# Patient Record
Sex: FEMALE | Race: WHITE | URBAN - METROPOLITAN AREA
[De-identification: names, ages, dates, MRNs, and addresses within clinical notes are randomized per-mention and may not be internally consistent; named-entity substitution may affect disease eponyms.]

---

## 2022-05-10 ENCOUNTER — HOSPITAL ENCOUNTER (OUTPATIENT)
Dept: PHYSICAL THERAPY | Age: 62
Discharge: HOME | End: 2022-05-10
Payer: COMMERCIAL

## 2022-05-10 PROCEDURE — 97162 PT EVAL MOD COMPLEX 30 MIN: CPT

## 2022-05-10 PROCEDURE — 97110 THERAPEUTIC EXERCISES: CPT

## 2022-05-10 PROCEDURE — 97530 THERAPEUTIC ACTIVITIES: CPT

## 2022-05-10 NOTE — PROGRESS NOTES
[facility]  : 1960  Primary: Zac Silva Of Alisa Man*  Secondary:  [facility] [place] [address] [facility], [address] [place] [address],[address], [address]  [phone]   [phone]       OUTPATIENT PHYSICAL THERAPY: Daily Treatment Note 5/10/2022   Visit Count:  1  ICD-10: Treatment Diagnosis: Lack of coordination (muscle incoordination) (R27.8), Pelvic floor dysfunction in female (M62.98), Mixed incontinence (Urge and stress incontinence) (N39.46), Nocturia (R35.1) and Myalgia (M79.1)  Precautions/Allergies:   Patient has no allergy information on record. TREATMENT PLAN:  Effective Dates: 5/10/2022 TO 2022 (90 days). Frequency/Duration: 1 time a week for 90 Day(s) MEDICAL/REFERRING DIAGNOSIS:  PFD (pelvic floor dysfunction) [M62.89]  DATE OF ONSET: 2 years ago ()  REFERRING PHYSICIAN: Maria Ines Manning MD MD Orders: Evaluate and treat  Return MD Appointment: Unknown     Pre-treatment Symptoms/Complaints: Mixed UI (IMAN>UUI) (IMAN with transitional movements), nocturia    Pain: Initial: Pain Intensity 1: 0/10 Post Session: 0/10   Medications Last Reviewed:  5/10/2022  Updated Objective Findings:  See evaluation note from today      TREATMENT:   THERAPEUTIC EXERCISE: (10 minutes):  Exercises per grid below to improve mobility, strength and coordination. Required moderate visual, verbal and tactile cues to promote proper body mechanics and promote proper body breathing techniques. Progressed resistance, range and repetitions as indicated.    Date:  5/10/22 Date:   Date:     Activity/Exercise Parameters Parameters Parameters   PF drops 2x10                   THERAPEUTIC ACTIVITY: (15 minutes): Functional activity education regarding anatomy, pathology and role of pelvic floor muscle (PFM) function in relation to presenting symptoms and role of pelvic floor therapy in conservative treatment., Functional activity instruction on use 'the knack' and feedforward activation of pelvic floor muscles prior to activity that increases intra-abdominal pressure (IAP) such as cough, sneeze, laugh in order to minimize stress urinary incontinence. and Instruction on coordinated pelvic floor and diaphragmatic breathing to improve kinesthetic awareness of pelvic muscle mobility and restore proper motor recruitment patterns with breathing, posture, and functional movement (e.g. appropriate lift/contraction with increased IAP such as a cough, laugh, sneeze to prevent incontinence as well as appropriate relaxation/drop to reduce pain with vaginal penetration). TA Educational Topic Notes Date Completed   Pathology/Anatomy/PFM Function Reviewed 5/10/22   Bladder health education     Urinary urge suppression     The knliang Demonstrated and reviewed, discussed functional use of kegels during daily routine (with STS, cough/sneeze, bend/lift), handout provided 5/10/22   Voiding strategies     Bowel/Bladder log     Bowel health education     Constipation care     Diarrhea/Fecal leakage     Colonic massage     Toilet positioning     Defecation dynamics     Sources of fiber     Return to intercourse/Dilator training     Sexual positioning     Lubricants/vaginal moisturizers     Vaginal irritants     Body mechanics     Posture/ergonomics     Diaphragmatic breathing Demonstrated and reviewed, discussed use for management of urinary urgency 5/10/22   Resources and technology       MANUAL THERAPY: (0 minutes): Soft tissue mobilization was utilized and necessary because of the patient's painful spasm and restricted motion of soft tissue.     Date Type Location Comments   5/10/2022 Internal assessment/treatment Via vaginal canal Assessment completed                                       (Used abbreviations: MET - muscle energy technique; SCS- Strain counter strain; CTM-Connective tissue mobilizations; CR- Contract/Relax; SP- Sustained pressure; PIT- Positional inhibition techniques; STM Soft -tissue mobilization; MM- Myofascial mobilization; TrP-Trigger point release; IASTM- Instrument assisted soft tissue mobilizations, TDN-Trigger point dry needling)    Pt gives verbal consent to internal vaginal assessment/treatment without chaperon present. Treatment/Session Summary:    · Response to Treatment:  Pt reports good understanding of plan of care, as well as prescribed home exercise program.  All questions were answered to pt's satisfaction. Pt was invited to call with any further questions or concerns. · Communication/Consultation:  None today  · Equipment provided today:  None today  · Recommendations/Intent for next treatment session: Next visit will focus on MSK and functional movement screen, body and breathing mechanics and education on pressure management, urge suppression techniques, review PF drops. · Variation from POC: N/A  Total Treatment Billable Duration: Evaluation 35 minutes, treatment 25 minutes  PT Patient Time In/Time Out  Time In: 1500  Time Out: [address], PT     No future appointments.      Visit # Therapist initials Date Arrived NS/ Cx < 24 hr >24 hr Cx Visit Comments   1 JONH 5/10/22 2:40pm   Initial Assessment and Treatment                                                                             Abbreviations: NS = No Show; CX = cancelled
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
## 2022-09-26 ENCOUNTER — HOSPITAL ENCOUNTER (OUTPATIENT)
Dept: PHYSICAL THERAPY | Age: 62
Setting detail: RECURRING SERIES
Discharge: HOME | End: 2022-09-29
Payer: COMMERCIAL

## 2022-09-26 PROCEDURE — 97530 THERAPEUTIC ACTIVITIES: CPT

## 2022-09-26 PROCEDURE — 97162 PT EVAL MOD COMPLEX 30 MIN: CPT

## 2022-09-26 NOTE — PROGRESS NOTES
Sade Munguia  : 1960  Primary: Jeana Soulier Sc  Secondary:  Morgan Gonzales  [facility] [address]  [address] [address]  Phone: [phone]  Fax: [phone] No data recorded  No data recorded    PT Visit Info:  No data recorded   Visit Count:  Visit count could not be calculated. Make sure you are using a visit which is associated with an episode. OUTPATIENT PHYSICAL THERAPY:OP NOTE TYPE: Treatment Note 2022       Episode  }Appt Desk             Treatment Diagnosis:    Treatment Diagnosis:  Lack of coordination (muscle incoordination) (R27.8)  Pelvic floor dysfunction in female (M62.98)  Urge incontinence (N39.41)  Overactive bladder (Detrusor muscle hyperactivity) (N32.81)  Myalgia (M79.1)  Medical/Referring Diagnosis:  PFD (pelvic floor dysfunction) [M62.89]  OAB (overactive bladder) [N32.81]  Referring Physician:  JASSON Sotomayor NP, MD Orders:  PT Eval and Treat   Date of Onset:  No data recorded   Allergies:   Patient has no allergy information on record. Restrictions/Precautions:  No data recorded  No data recorded   Interventions Planned (Treatment may consist of any combination of the following):    Current Treatment Recommendations: Strengthening; ROM; Functional mobility training; Neuromuscular re-education; Manual Therapy - Soft Tissue Mobilization; Home exercise program; Pain management; Therapeutic activities     Subjective Comments:   See initial evaluation.  UUI>LUKASZ  Initial:}     /10Post Session:        /10  Medications Last Reviewed:  2022  Updated Objective Findings:  See evaluation note from today  Treatment   THERAPEUTIC ACTIVITY: ( 25 minutes): Functional activity education regarding anatomy, pathology and role of pelvic floor muscle (PFM) function in relation to presenting symptoms and role of pelvic floor therapy in conservative treatment., Functional activity education on avoiding bladder irritants, substitutions for common irritants, recommended fluid intake (types and spacing), appropriate voiding frequency, weight management and overall contributing factors of bowel health on bladder health/function in order to improve independent self management. Patient was provided a list of common bladder irritants including caffeine, carbonation, alcohol, artificial sweeteners, chocolate, acidic drinks, and tomato based drinks. , and Instruction on coordinated pelvic floor and diaphragmatic breathing to improve kinesthetic awareness of pelvic muscle mobility and restore proper motor recruitment patterns with breathing, posture, and functional movement (e.g. appropriate lift/contraction with increased IAP such as a cough, laugh, sneeze to prevent incontinence as well as appropriate relaxation/drop to reduce pain with vaginal penetration). TA Educational Topic Notes Date Completed   Pathology/Anatomy/PFM Function Completed 9/26/22   Bladder health education Completed 9/26/22   Urinary urge suppression     The knack     Voiding strategies     Nocturia tips     Bowel/Bladder log     Bowel health education     Constipation care     Diarrhea/Fecal leakage     Colonic massage     Toilet positioning     Defecation dynamics     Sources of fiber     Return to intercourse/Dilator training     Sexual positioning     Lubricants/vaginal moisturizers     Vaginal irritants     Body mechanics     Posture/ergonomics     Diaphragmatic breathing Prescribed and practiced in session as part of HEP 9/26/22   Resources and technology          Treatment/Session Summary:    Treatment Assessment: Pt verbalized understanding of POC. All questions answered at this time.       Communication/Consultation:  None today  Equipment provided today:  None  Recommendations/Intent for next treatment session: Next visit will focus on review HEP, PFM coordination training, sEMG biofeedback for proprioceptive awareness of PFM    Total Treatment Billable Duration:  30 minutes evaluation, 25 minutes treatment  Time In: 1082  Time Out: [address], PT       Charge Capture  }Post Session Pain  PT Visit Info  [address] Portal  MD Guidelines  Scanned Media  Benefits  MyChart    Future Appointments   Date Time Provider Delisa Garcia   10/3/2022 10:00 AM Conrad vivar PT [facility]   10/10/2022  2:00 PM Conrad vivar PT [facility]   10/17/2022  2:00 PM Conrad vivar PT [facility]   10[facility]24[facility]2022  [facility]   10/31/2022  2:00 PM Conrad vivar PT [facility]

## 2022-10-03 ENCOUNTER — HOSPITAL ENCOUNTER (OUTPATIENT)
Dept: PHYSICAL THERAPY | Age: 62
Setting detail: RECURRING SERIES
Discharge: HOME | End: 2022-10-06
Payer: COMMERCIAL

## 2022-10-03 PROCEDURE — 97140 MANUAL THERAPY 1/> REGIONS: CPT

## 2022-10-03 PROCEDURE — 97110 THERAPEUTIC EXERCISES: CPT

## 2022-10-03 PROCEDURE — 97530 THERAPEUTIC ACTIVITIES: CPT

## 2022-10-03 NOTE — PROGRESS NOTES
La Méndez  : 1960  Primary: Krystal Bustillo Sc  Secondary:  Hardik Hobson  [address] [address]  Phone: [phone]  Fax: [phone] No data recorded  No data recorded    PT Visit Info:  No data recorded   Visit Count:  2   OUTPATIENT PHYSICAL THERAPY:OP NOTE TYPE: Treatment Note 10/3/2022       Episode  }Appt Desk             Treatment Diagnosis:  Lack of coordination (muscle incoordination) (R27.8)  Pelvic floor dysfunction in female (M62.98)  Urge incontinence (N39.41)  Overactive bladder (Detrusor muscle hyperactivity) (N32.81)  Myalgia (M79.1)  Medical/Referring Diagnosis:  PFD (pelvic floor dysfunction) [M62.89]  OAB (overactive bladder) [N32.81]  Referring Physician:  JASSON Lau NP, MD Orders:  PT Eval and Treat   Date of Onset:  No data recorded   Allergies:   Patient has no allergy information on record. Restrictions/Precautions:  No data recorded  No data recorded   Interventions Planned (Treatment may consist of any combination of the following):    Current Treatment Recommendations: Strengthening; ROM; Functional mobility training; Neuromuscular re-education; Manual Therapy - Soft Tissue Mobilization; Home exercise program; Pain management; Therapeutic activities     Subjective Comments:  Today: Pt is practicing diaphragmatic breathing, which feels very intentional.   Pt denies pain at this time and following the examination. Subjective at Evaluation: UUI>LUKASZ  Ms. Torres Rater is a 81 yo female referred to pelvic floor physical therapy (PFPT) by Dylon Ruvalcaba,* 2/2 PFD (pelvic floor dysfunction) [M62.89]  OAB (overactive bladder) [N32.81] Pt reports that symptoms began 2 years ago with urinary urgency and inability to make it to the restroom in time. States she has 3 days/week where she has difficulty controlling her bladder. She notices this is worse when she travels. LUKASZ is less common.      Initial:}     0/10Post Session:        /10  Medications Last Reviewed:  10/3/2022  Updated Objective Findings:    Tender to palpation throughout superficial and deep PFM. Paradoxical contraction present. Minimal descent in PFM. Treatment   THERAPEUTIC ACTIVITY: (15 minutes): Functional activity education regarding anatomy, pathology and role of pelvic floor muscle (PFM) function in relation to presenting symptoms and role of pelvic floor therapy in conservative treatment., Functional activity education on avoiding bladder irritants, substitutions for common irritants, recommended fluid intake (types and spacing), appropriate voiding frequency, weight management and overall contributing factors of bowel health on bladder health/function in order to improve independent self management. Patient was provided a list of common bladder irritants including caffeine, carbonation, alcohol, artificial sweeteners, chocolate, acidic drinks, and tomato based drinks. , and Instruction on coordinated pelvic floor and diaphragmatic breathing to improve kinesthetic awareness of pelvic muscle mobility and restore proper motor recruitment patterns with breathing, posture, and functional movement (e.g. appropriate lift/contraction with increased IAP such as a cough, laugh, sneeze to prevent incontinence as well as appropriate relaxation/drop to reduce pain with vaginal penetration).     TA Educational Topic Notes Date Completed   Pathology/Anatomy/PFM Function Completed 9/26/22   Bladder health education Completed 9/26/22   Urinary urge suppression Reviewed and provided handout 10/3/22   The carlito     Voiding strategies     Nocturia tips     Bowel/Bladder log     Bowel health education     Constipation care     Diarrhea/Fecal leakage     Colonic massage     Toilet positioning     Defecation dynamics     Sources of fiber     Return to intercourse/Dilator training     Sexual positioning     Lubricants/vaginal moisturizers     Vaginal irritants     Body Guidelines  Scanned Media  Benefits  MyChart    Future Appointments   Date Time Provider Delisa Garcia   [facility]   [facility]  [facility]   10/24/2022  2:00 PM Conrad vivar [facility]   10/31/2022  2:00 PM Conrad vivar [facility]

## 2022-10-10 ENCOUNTER — HOSPITAL ENCOUNTER (OUTPATIENT)
Dept: PHYSICAL THERAPY | Age: 62
Setting detail: RECURRING SERIES
Discharge: HOME | End: 2022-10-13
Payer: COMMERCIAL

## 2022-10-10 PROCEDURE — 97530 THERAPEUTIC ACTIVITIES: CPT

## 2022-10-10 PROCEDURE — 97140 MANUAL THERAPY 1/> REGIONS: CPT

## 2022-10-10 PROCEDURE — 97110 THERAPEUTIC EXERCISES: CPT

## 2022-10-10 NOTE — PROGRESS NOTES
Bess Dema  : 1960  Primary: Joanne Jacobo Sc  Secondary:  Wesly Moss 82  [address] [address]  Phone: [phone]  Fax: [phone] No data recorded  No data recorded    PT Visit Info:  No data recorded   Visit Count:  3   OUTPATIENT PHYSICAL THERAPY:OP NOTE TYPE: Treatment Note 10/10/2022       Episode  }Appt Desk             Treatment Diagnosis:  Lack of coordination (muscle incoordination) (R27.8)  Pelvic floor dysfunction in female (M62.98)  Urge incontinence (N39.41)  Overactive bladder (Detrusor muscle hyperactivity) (N32.81)  Myalgia (M79.1)  Medical/Referring Diagnosis:  PFD (pelvic floor dysfunction) [M62.89]  OAB (overactive bladder) [N32.81]  Referring Physician:  JASSON Fontaine NP, MD Orders:  PT Eval and Treat   Date of Onset:  No data recorded   Allergies:   Patient has no allergy information on record. Restrictions/Precautions:  No data recorded  No data recorded   Interventions Planned (Treatment may consist of any combination of the following):    Current Treatment Recommendations: Strengthening; ROM; Functional mobility training; Neuromuscular re-education; Manual Therapy - Soft Tissue Mobilization; Home exercise program; Pain management; Therapeutic activities     Subjective Comments:  Pt states she continues to have UI, however exercises are helping to minimize this. Pt reports cramping following previous session of PFPT. This lasted the rest of the day into the next day. She has not had any pain since. When she feels urge she is practicing breathing first.   Drinking water consistently. Subjective at Evaluation: UUI>LUKASZ  Ms. Bhavya Phoenix is a 81 yo female referred to pelvic floor physical therapy (PFPT) by Waleska Delacruz,* 2/2 PFD (pelvic floor dysfunction) [M62.89]  OAB (overactive bladder) [N32.81] Pt reports that symptoms began 2 years ago with urinary urgency and inability to make it to the restroom in time.  States she has 3 days/week where she has difficulty controlling her bladder. She notices this is worse when she travels. LUKASZ is less common. Initial:}     0/10Post Session:        /10  Medications Last Reviewed:  10/10/2022  Updated Objective Findings:      Tender to palpation through left vaginal wall. Moderate descent in PFM. PFM contraction: 4/5. Pt able to complete coordinated quick flicks today    Treatment   THERAPEUTIC ACTIVITY: (10 minutes): Functional activity education regarding anatomy, pathology and role of pelvic floor muscle (PFM) function in relation to presenting symptoms and role of pelvic floor therapy in conservative treatment., Functional activity education on avoiding bladder irritants, substitutions for common irritants, recommended fluid intake (types and spacing), appropriate voiding frequency, weight management and overall contributing factors of bowel health on bladder health/function in order to improve independent self management. Patient was provided a list of common bladder irritants including caffeine, carbonation, alcohol, artificial sweeteners, chocolate, acidic drinks, and tomato based drinks. , and Instruction on coordinated pelvic floor and diaphragmatic breathing to improve kinesthetic awareness of pelvic muscle mobility and restore proper motor recruitment patterns with breathing, posture, and functional movement (e.g. appropriate lift/contraction with increased IAP such as a cough, laugh, sneeze to prevent incontinence as well as appropriate relaxation/drop to reduce pain with vaginal penetration).     TA Educational Topic Notes Date Completed   Pathology/Anatomy/PFM Function Completed 9/26/22   Bladder health education Completed 9/26/22   Urinary urge suppression Reviewed and provided handout  Reviewed 10/3/22  10/10/22   The carlito     Voiding strategies     Nocturia tips     Bowel/Bladder log     Bowel health education     Constipation care     Diarrhea/Fecal leakage     Colonic massage     Toilet positioning     Defecation dynamics     Sources of fiber     Return to intercourse/Dilator training     Sexual positioning     Lubricants/vaginal moisturizers     Vaginal irritants     Body mechanics     Posture/ergonomics     Diaphragmatic breathing Prescribed and practiced in session as part of HEP  reviewed 9/26/22  10/3/22   Resources and technology        MANUAL THERAPY: (15 minutes): Soft tissue mobilization was utilized and necessary because of the patient's restricted motion of soft tissue. Date Type Location Comments   10/10/2022 Internal assessment/treatment Via vaginal canal CTM through superficial and deep PFM. Digital cues for PFM contraction - light to moderate pressure applied to musculature                                       (Used abbreviations: MET - muscle energy technique; SCS- Strain counter strain; CTM-Connective tissue mobilizations; CR- Contract/Relax; SP- Sustained pressure; PIT- Positional inhibition techniques; STM Soft -tissue mobilization; MM- Myofascial mobilization; TrP-Trigger point release; IASTM- Instrument assisted soft tissue mobilizations, TDN-Trigger point dry needling)     THERAPEUTIC EXERCISE: (30 minutes)  Digital cues for PF drops with diaphragmatic breathing x 10  Seated hip adduction/ball squeeze with exhalation  2 x 10  Quick flicks 3 x 5 sets  Standing hip abduction , extension 2 x 10 - cues in hip abduction to maintain neutral hip alignment  Sit-to-stand with exhalation 2 x 8    Pt gives verbal consent to internal vaginal assessment/treatment without chaperon present. Treatment/Session Summary:    Treatment Assessment: . Pt with improved proprioceptive awareness, coordination, and activation of her PFM today. Pt demonstrated ability to perform steps for urge suppression. I encouraged pt to practice this in supine, seated, standing.    Communication/Consultation:  None today  Equipment provided today: None  Recommendations/Intent for next treatment session: Next visit will focus on review HEP, Review PFM coordination, continue MT to PFM, continue global strengthening.      Total Treatment Billable Duration:  Treatment minutes 55  Time In: 0205  Time Out: 0300    Julia Linares, PT       Charge Capture  }Post Session Pain  PT Visit Info  [address] Portal  MD Guidelines  Scanned Media  Benefits  MyChart    Future Appointments   Date Time Provider Delisa Garcia   10/17/2022  2:00 PM Bubba Hartley [facility]   10/24/2022  2:00 PM Julia Linares PT [facility]   10/31/2022  [facility]00 [facility]

## 2022-10-17 ENCOUNTER — HOSPITAL ENCOUNTER (OUTPATIENT)
Dept: PHYSICAL THERAPY | Age: 62
Setting detail: RECURRING SERIES
Discharge: HOME | End: 2022-10-20
Payer: COMMERCIAL

## 2022-10-17 PROCEDURE — 97110 THERAPEUTIC EXERCISES: CPT

## 2022-10-17 PROCEDURE — 97140 MANUAL THERAPY 1/> REGIONS: CPT

## 2022-10-17 PROCEDURE — 97530 THERAPEUTIC ACTIVITIES: CPT

## 2022-10-17 NOTE — PROGRESS NOTES
Lore Vallejo  : 1960  Primary: Joaquin Elise Sc  Secondary:  Bella Ariza  [address] [address]  Phone: [phone]  Fax: [phone] No data recorded  No data recorded    PT Visit Info:  No data recorded   Visit Count:  4   OUTPATIENT PHYSICAL THERAPY:OP NOTE TYPE: Treatment Note 10/17/2022       Episode  }Appt Desk             Treatment Diagnosis:  Lack of coordination (muscle incoordination) (R27.8)  Pelvic floor dysfunction in female (M62.98)  Urge incontinence (N39.41)  Overactive bladder (Detrusor muscle hyperactivity) (N32.81)  Myalgia (M79.1)  Medical/Referring Diagnosis:  PFD (pelvic floor dysfunction) [M62.89]  OAB (overactive bladder) [N32.81]  Referring Physician:  JASSON Ng NP, MD Orders:  PT Eval and Treat   Date of Onset:  No data recorded   Allergies:   Patient has no allergy information on record. Restrictions/Precautions:  No data recorded  No data recorded   Interventions Planned (Treatment may consist of any combination of the following):    Current Treatment Recommendations: Strengthening; ROM; Functional mobility training; Neuromuscular re-education; Manual Therapy - Soft Tissue Mobilization; Home exercise program; Pain management; Therapeutic activities     Subjective Comments:  Pt denies cramping following previous session of PFPT. Improved urinary control towards end of week. Notices she needs to monitor water intake at the end of the day. Pt will be traveling the next 3 days. Pt able to delay urges 3x yesterday. Subjective at Evaluation: UUI>LUKASZ  Ms. Masood Martines is a 79 yo female referred to pelvic floor physical therapy (PFPT) by Kimberly Peres,* 2/2 PFD (pelvic floor dysfunction) [M62.89]  OAB (overactive bladder) [N32.81] Pt reports that symptoms began 2 years ago with urinary urgency and inability to make it to the restroom in time.   States she has 3 days/week where she has difficulty controlling her bladder. She notices this is worse when she travels. LUKASZ is less common. Initial:}     0/10Post Session:       0 /10  Medications Last Reviewed:  10/17/2022  Updated Objective Findings:      Tender to palpation left STP and iliococcygeus. Moderate descent in PFM. PFM contraction: 4/5. Pt able to complete coordinated quick flicks today. Mild breath holding present. Treatment   THERAPEUTIC ACTIVITY: (10 minutes): Functional activity education regarding anatomy, pathology and role of pelvic floor muscle (PFM) function in relation to presenting symptoms and role of pelvic floor therapy in conservative treatment., Functional activity education on avoiding bladder irritants, substitutions for common irritants, recommended fluid intake (types and spacing), appropriate voiding frequency, weight management and overall contributing factors of bowel health on bladder health/function in order to improve independent self management. Patient was provided a list of common bladder irritants including caffeine, carbonation, alcohol, artificial sweeteners, chocolate, acidic drinks, and tomato based drinks. , and Instruction on coordinated pelvic floor and diaphragmatic breathing to improve kinesthetic awareness of pelvic muscle mobility and restore proper motor recruitment patterns with breathing, posture, and functional movement (e.g. appropriate lift/contraction with increased IAP such as a cough, laugh, sneeze to prevent incontinence as well as appropriate relaxation/drop to reduce pain with vaginal penetration).     TA Educational Topic Notes Date Completed   Pathology/Anatomy/PFM Function Completed 9/26/22   Bladder health education Completed  Reviewed - timed voiding 9/26/22  10/17/22   Urinary urge suppression Reviewed and provided handout  Reviewed  Reviewed 10/3/22  10/10/22  10/17/22   The carlito     Voiding strategies     Nocturia tips     Bowel/Bladder log     Bowel health education Constipation care     Diarrhea/Fecal leakage     Colonic massage     Toilet positioning     Defecation dynamics     Sources of fiber     Return to intercourse/Dilator training     Sexual positioning     Lubricants/vaginal moisturizers     Vaginal irritants     Body mechanics     Posture/ergonomics     Diaphragmatic breathing Prescribed and practiced in session as part of HEP  reviewed 9/26/22  10/3/22   Resources and technology        MANUAL THERAPY: (15 minutes): Soft tissue mobilization was utilized and necessary because of the patient's restricted motion of soft tissue. Date Type Location Comments   10/17/2022 Internal assessment/treatment Via vaginal canal CTM through superficial and deep PFM. Digital cues for PFM contraction - light to moderate pressure applied to musculature                                       (Used abbreviations: MET - muscle energy technique; SCS- Strain counter strain; CTM-Connective tissue mobilizations; CR- Contract/Relax; SP- Sustained pressure; PIT- Positional inhibition techniques; STM Soft -tissue mobilization; MM- Myofascial mobilization; TrP-Trigger point release; IASTM- Instrument assisted soft tissue mobilizations, TDN-Trigger point dry needling)     THERAPEUTIC EXERCISE: (20 minutes)  Digital cues for PF drops with diaphragmatic breathing x 10  Seated hip adduction/ball squeeze with exhalation  2 x 10  Quick flicks 3 x 5 sets - digital cues  Standing hip abduction , extension x 30 - cues in hip abduction to maintain neutral hip alignment  Squat to stand (at railing), coordinated with exhalation 2 x 10    Pt gives verbal consent to internal vaginal assessment/treatment without chaperon present. Treatment/Session Summary:    Treatment Assessment: . Continues to report tenderness to palpation of PFM with internal examination. Overall, pt verbalizes and is demonstrating understanding of PFM coordination and sequencing contractions well for urge suppression.  Mild cuing needed to prevent breath holding with quick flicks. Communication/Consultation:  None today  Equipment provided today:  None  Recommendations/Intent for next treatment session: Next visit will focus on review HEP, Review PFM coordination, continue MT to PFM, continue global strengthening.      Total Treatment Billable Duration:  Treatment minutes 45  Time In: 0215  Time Out: 0300    Gabi Dorman, PT       Charge Capture  }Post Session Pain  PT Visit Info  [address] [facility] Portal  MD Guidelines  Scanned Media  Benefits  MyChart    Future Appointments   Date Time Provider Delisa Garcia   10/24/2022  2:00 PM Gabi Dorman [facility]   10/31/2022  2:00 PM [facility]

## 2022-10-24 ENCOUNTER — HOSPITAL ENCOUNTER (OUTPATIENT)
Dept: PHYSICAL THERAPY | Age: 62
Setting detail: RECURRING SERIES
Discharge: HOME | End: 2022-10-27
Payer: COMMERCIAL

## 2022-10-24 PROCEDURE — 97110 THERAPEUTIC EXERCISES: CPT

## 2022-10-24 PROCEDURE — 97140 MANUAL THERAPY 1/> REGIONS: CPT

## 2022-10-24 NOTE — PROGRESS NOTES
Napoleon Bradford  : 1960  Primary: Sridevi Bakering Sc  Secondary:  Madan Dubon  [facility] [address]  [address] [place] [address] Way [address]  Phone: [phone]  Fax: [phone] No data recorded  No data recorded    PT Visit Info:  No data recorded   Visit Count:  5   OUTPATIENT PHYSICAL THERAPY:OP NOTE TYPE: Treatment Note 10/24/2022       Episode  }Appt Desk             Treatment Diagnosis:  Lack of coordination (muscle incoordination) (R27.8)  Pelvic floor dysfunction in female (M62.98)  Urge incontinence (N39.41)  Overactive bladder (Detrusor muscle hyperactivity) (N32.81)  Myalgia (M79.1)  Medical/Referring Diagnosis:  PFD (pelvic floor dysfunction) [M62.89]  OAB (overactive bladder) [N32.81]  Referring Physician:  JASSON Cutler NP, MD Orders:  PT Eval and Treat   Date of Onset:  No data recorded   Allergies:   Patient has no allergy information on record. Restrictions/Precautions:  No data recorded  No data recorded   Interventions Planned (Treatment may consist of any combination of the following):    Current Treatment Recommendations: Strengthening; ROM; Functional mobility training; Neuromuscular re-education; Manual Therapy - Soft Tissue Mobilization; Home exercise program; Pain management; Therapeutic activities     Subjective Comments:  Pt denies discomfort following previous session of physical therapy. Pt had a good week in regards to urinary control. States she had no accidents. She did very well driving, reporting minimal incontinence. States did have loss or urine with lifting her puppy this morning, but feels she may have waited too long. Subjective at Evaluation: UUI>LUKASZ  Ms. Aydin Dalton is a 81 yo female referred to pelvic floor physical therapy (PFPT) by Tosin Tolliver,* 2/2 PFD (pelvic floor dysfunction) [M62.89]  OAB (overactive bladder) [N32.81] Pt reports that symptoms began 2 years ago with urinary urgency and inability to make it to the restroom in time. States she has 3 days/week where she has difficulty controlling her bladder. She notices this is worse when she travels. LUKASZ is less common. Initial:}     0/10Post Session:       0 /10  Medications Last Reviewed:  10/24/2022  Updated Objective Findings:    Tender to palpation B STP, DTP, and iliococcygeus. Moderate descent in PFM. PFM contraction: 3/5    Treatment   THERAPEUTIC ACTIVITY: (minutes): Functional activity education regarding anatomy, pathology and role of pelvic floor muscle (PFM) function in relation to presenting symptoms and role of pelvic floor therapy in conservative treatment., Functional activity education on avoiding bladder irritants, substitutions for common irritants, recommended fluid intake (types and spacing), appropriate voiding frequency, weight management and overall contributing factors of bowel health on bladder health/function in order to improve independent self management. Patient was provided a list of common bladder irritants including caffeine, carbonation, alcohol, artificial sweeteners, chocolate, acidic drinks, and tomato based drinks. , and Instruction on coordinated pelvic floor and diaphragmatic breathing to improve kinesthetic awareness of pelvic muscle mobility and restore proper motor recruitment patterns with breathing, posture, and functional movement (e.g. appropriate lift/contraction with increased IAP such as a cough, laugh, sneeze to prevent incontinence as well as appropriate relaxation/drop to reduce pain with vaginal penetration).     TA Educational Topic Notes Date Completed   Pathology/Anatomy/PFM Function Completed 9/26/22   Bladder health education Completed  Reviewed - timed voiding 9/26/22  10/17/22   Urinary urge suppression Reviewed and provided handout  Reviewed  Reviewed 10/3/22  10/10/22  10/17/22   The carlito     Voiding strategies     Nocturia tips     Bowel/Bladder log     Bowel health education     Constipation care Diarrhea/Fecal leakage     Colonic massage     Toilet positioning     Defecation dynamics     Sources of fiber     Return to intercourse/Dilator training     Sexual positioning     Lubricants/vaginal moisturizers     Vaginal irritants     Body mechanics     Posture/ergonomics     Diaphragmatic breathing Prescribed and practiced in session as part of HEP  reviewed 9/26/22  10/3/22   Resources and technology        MANUAL THERAPY: (24 minutes): Soft tissue mobilization was utilized and necessary because of the patient's restricted motion of soft tissue. Date Type Location Comments   10/24/2022 Internal assessment/treatment Via vaginal canal CTM through superficial and deep PFM. Digital cues for PFM contraction - light to moderate pressure applied to musculature                                       (Used abbreviations: MET - muscle energy technique; SCS- Strain counter strain; CTM-Connective tissue mobilizations; CR- Contract/Relax; SP- Sustained pressure; PIT- Positional inhibition techniques; STM Soft -tissue mobilization; MM- Myofascial mobilization; TrP-Trigger point release; IASTM- Instrument assisted soft tissue mobilizations, TDN-Trigger point dry needling)     THERAPEUTIC EXERCISE: (30 minutes)  Digital cues for PF drops with diaphragmatic breathing x 10  Standing hip adduction/ball squeeze with exhalation  2 x 10, Black TB  Quick flicks 3 x 5 sets - digital cues - did not complete today  Standing hip abduction , extension x 30 - FOAM, cues in hip abduction to maintain neutral hip alignment  Squat to stand (at railing), coordinated with exhalation 2 x 10  Side step-up- foam x 20  Leg press #35 x 10    Pt gives verbal consent to internal vaginal assessment/treatment without chaperon present. Treatment/Session Summary:    Treatment Assessment: . Continues to report tenderness to palpation of PFM with internal examination. Discomfort tends to resolve with manual therapy.  Pt requires verbal cues for coordination of PFM contraction with TrA in against gravity positions and movements. Communication/Consultation:  None today  Equipment provided today:  None  Recommendations/Intent for next treatment session: Next visit will focus on review HEP, Review PFM coordination, continue MT to PFM, continue global strengthening.      Total Treatment Billable Duration:  Treatment minutes 54  Time In: 0205  Time Out: 0300    Nhan Obregon PT       Charge Capture  }Post Session Pain  PT Visit Info  [facility] Portal  MD Guidelines  Scanned Media  Benefits  MyChart    Future Appointments   Date Time Provider Delisa Radha   10/31/2022  2:00 PM Nhan Obregon, PT [facility]

## 2022-10-31 ENCOUNTER — HOSPITAL ENCOUNTER (OUTPATIENT)
Dept: PHYSICAL THERAPY | Age: 62
Setting detail: RECURRING SERIES
Discharge: HOME | End: 2022-11-03
Payer: COMMERCIAL

## 2022-10-31 PROCEDURE — 97140 MANUAL THERAPY 1/> REGIONS: CPT

## 2022-10-31 PROCEDURE — 97110 THERAPEUTIC EXERCISES: CPT

## 2022-10-31 NOTE — PROGRESS NOTES
Stuart Gee  : 1960  Primary: Ray Ortiz Sc  Secondary:  Petra Danielle  [address] [address]  Phone: [phone]  Fax: [phone] No data recorded  No data recorded    PT Visit Info:  No data recorded   Visit Count:  6   OUTPATIENT PHYSICAL THERAPY:OP NOTE TYPE: Treatment Note 10/31/2022       Episode  }Appt Desk             Treatment Diagnosis:  Lack of coordination (muscle incoordination) (R27.8)  Pelvic floor dysfunction in female (M62.98)  Urge incontinence (N39.41)  Overactive bladder (Detrusor muscle hyperactivity) (N32.81)  Myalgia (M79.1)  Medical/Referring Diagnosis:  PFD (pelvic floor dysfunction) [M62.89]  OAB (overactive bladder) [N32.81]  Referring Physician:  JASSON White NP, MD Orders:  PT Eval and Treat   Date of Onset:  No data recorded   Allergies:   Patient has no allergy information on record. Restrictions/Precautions:  No data recorded  No data recorded   Interventions Planned (Treatment may consist of any combination of the following):    Current Treatment Recommendations: Strengthening; ROM; Functional mobility training; Neuromuscular re-education; Manual Therapy - Soft Tissue Mobilization; Home exercise program; Pain management; Therapeutic activities     Subjective Comments:  Pt states her balance has been very bad since she had a fall. She notices herself leaning towards her left. Denies headaches. Reports fatigue. Pt has had urinary accidents the last 2 days. States she is moving more slowly to the commode due to feeling unsteady. Notices her self having difficulty coordinating her breathing and pelvic floor. Pt felt good after her previous session of PFPT and through rest of week. She started feeling bad over the weekend. Subjective at Evaluation: UUI>LUKASZ  Ms. Raj Del Toro is a 79 yo female referred to pelvic floor physical therapy (PFPT) by Aranza Ponce,* 2/2 PFD (pelvic floor dysfunction) [M62.89]  OAB (overactive bladder) [N32.81] Pt reports that symptoms began 2 years ago with urinary urgency and inability to make it to the restroom in time. States she has 3 days/week where she has difficulty controlling her bladder. She notices this is worse when she travels. LUKASZ is less common. Initial:}     0/10Post Session:       0 /10  Medications Last Reviewed:  10/31/2022  Updated Objective Findings:    Tender to palpation L STP, DTP, and iliococcygeus. Moderate descent in PFM. PFM contraction: 4/5 x 5 reps    Treatment   THERAPEUTIC ACTIVITY: (minutes): Functional activity education regarding anatomy, pathology and role of pelvic floor muscle (PFM) function in relation to presenting symptoms and role of pelvic floor therapy in conservative treatment., Functional activity education on avoiding bladder irritants, substitutions for common irritants, recommended fluid intake (types and spacing), appropriate voiding frequency, weight management and overall contributing factors of bowel health on bladder health/function in order to improve independent self management. Patient was provided a list of common bladder irritants including caffeine, carbonation, alcohol, artificial sweeteners, chocolate, acidic drinks, and tomato based drinks. , and Instruction on coordinated pelvic floor and diaphragmatic breathing to improve kinesthetic awareness of pelvic muscle mobility and restore proper motor recruitment patterns with breathing, posture, and functional movement (e.g. appropriate lift/contraction with increased IAP such as a cough, laugh, sneeze to prevent incontinence as well as appropriate relaxation/drop to reduce pain with vaginal penetration).     TA Educational Topic Notes Date Completed   Pathology/Anatomy/PFM Function Completed 9/26/22   Bladder health education Completed  Reviewed - timed voiding 9/26/22  10/17/22   Urinary urge suppression Reviewed and provided handout  Reviewed  Reviewed 10/3/22  10/10/22  10/17/22   The knack     Voiding strategies     Nocturia tips     Bowel/Bladder log     Bowel health education     Constipation care     Diarrhea/Fecal leakage     Colonic massage     Toilet positioning     Defecation dynamics     Sources of fiber     Return to intercourse/Dilator training     Sexual positioning     Lubricants/vaginal moisturizers     Vaginal irritants     Body mechanics     Posture/ergonomics     Diaphragmatic breathing Prescribed and practiced in session as part of HEP  reviewed 9/26/22  10/3/22   Resources and technology        MANUAL THERAPY: (25 minutes): Soft tissue mobilization was utilized and necessary because of the patient's restricted motion of soft tissue. Date Type Location Comments   10/31/2022 Internal assessment/treatment Via vaginal canal CTM through superficial and deep PFM. Digital cues for PFM contraction - moderate pressure applied to musculature. SCS assisted to relieve pelvic pain. (Used abbreviations: MET - muscle energy technique; SCS- Strain counter strain; CTM-Connective tissue mobilizations; CR- Contract/Relax; SP- Sustained pressure; PIT- Positional inhibition techniques; STM Soft -tissue mobilization; MM- Myofascial mobilization; TrP-Trigger point release; IASTM- Instrument assisted soft tissue mobilizations, TDN-Trigger point dry needling)     THERAPEUTIC EXERCISE: (25 minutes)  Digital cues for PF drops with diaphragmatic breathing x 10  Standing hip adduction/ball squeeze with exhalation  2 x 10, Black TB - held today  Quick flicks 3 x 5 sets - digital cues   Standing hip abduction , extension x 30 - FOAM, cues in hip abduction to maintain neutral hip alignment  Squat to stand (at railing), coordinated with exhalation 2 x 10 - held today  Side step-up- foam x 20  Leg press #35 x 10 - held today  Marching in place    Pt gives verbal consent to internal vaginal assessment/treatment without chaperon present. Treatment/Session Summary:    Treatment Assessment: . Pt presented today with increased challenge completing balance tasks. She fatigued quickly with single leg balance on her right. Pt continues to show improved power of her PFM (4/5). She fatigues with increase in repetitions and with fatigue, her coordination declines. I encouraged pt to f/u with her primary care doctor regarding recent fall. Communication/Consultation:  None today  Equipment provided today:  None  Recommendations/Intent for next treatment session: Next visit will focus on review HEP, Review PFM coordination, continue MT to PFM, continue global strengthening.      Total Treatment Billable Duration:  Treatment minutes 50  Time In: 0210  Time Out: 0300    Alex Robbins PT       Charge Capture  }Post Session Pain  PT Visit Info  [address] Portal  MD Guidelines  Scanned Media  Benefits  MyChart    Future Appointments   Date Time Provider Delisa Garcia   11/30/2022  1:00 PM Alex Robbins PT [facility]